# Patient Record
Sex: MALE | Race: BLACK OR AFRICAN AMERICAN | NOT HISPANIC OR LATINO | Employment: FULL TIME | URBAN - METROPOLITAN AREA
[De-identification: names, ages, dates, MRNs, and addresses within clinical notes are randomized per-mention and may not be internally consistent; named-entity substitution may affect disease eponyms.]

---

## 2019-05-07 ENCOUNTER — HOSPITAL ENCOUNTER (EMERGENCY)
Facility: HOSPITAL | Age: 54
Discharge: HOME | End: 2019-05-07
Attending: EMERGENCY MEDICINE | Admitting: EMERGENCY MEDICINE

## 2019-05-07 VITALS
BODY MASS INDEX: 36.68 KG/M2 | WEIGHT: 295 LBS | DIASTOLIC BLOOD PRESSURE: 105 MMHG | TEMPERATURE: 97.9 F | HEIGHT: 75 IN | OXYGEN SATURATION: 100 % | SYSTOLIC BLOOD PRESSURE: 142 MMHG | RESPIRATION RATE: 15 BRPM | HEART RATE: 66 BPM

## 2019-05-07 DIAGNOSIS — S16.1XXA STRAIN OF NECK MUSCLE, INITIAL ENCOUNTER: Primary | ICD-10-CM

## 2019-05-07 LAB
ANION GAP SERPL CALCULATED.3IONS-SCNC: 14 MMOL/L
BASOPHILS # BLD AUTO: 0.02 10*3/MM3 (ref 0–0.2)
BASOPHILS NFR BLD AUTO: 0.3 % (ref 0–1.5)
BUN BLD-MCNC: 18 MG/DL (ref 6–20)
BUN/CREAT SERPL: 13.8 (ref 7–25)
CALCIUM SPEC-SCNC: 9.3 MG/DL (ref 8.6–10.5)
CHLORIDE SERPL-SCNC: 102 MMOL/L (ref 98–107)
CO2 SERPL-SCNC: 25 MMOL/L (ref 22–29)
CREAT BLD-MCNC: 1.3 MG/DL (ref 0.76–1.27)
DEPRECATED RDW RBC AUTO: 42.4 FL (ref 37–54)
EOSINOPHIL # BLD AUTO: 0.23 10*3/MM3 (ref 0–0.4)
EOSINOPHIL NFR BLD AUTO: 3.7 % (ref 0.3–6.2)
ERYTHROCYTE [DISTWIDTH] IN BLOOD BY AUTOMATED COUNT: 14.6 % (ref 12.3–15.4)
GFR SERPL CREATININE-BSD FRML MDRD: 70 ML/MIN/1.73
GLUCOSE BLD-MCNC: 99 MG/DL (ref 65–99)
HCT VFR BLD AUTO: 40.2 % (ref 37.5–51)
HGB BLD-MCNC: 13.1 G/DL (ref 13–17.7)
IMM GRANULOCYTES # BLD AUTO: 0.01 10*3/MM3 (ref 0–0.05)
IMM GRANULOCYTES NFR BLD AUTO: 0.2 % (ref 0–0.5)
LYMPHOCYTES # BLD AUTO: 2.01 10*3/MM3 (ref 0.7–3.1)
LYMPHOCYTES NFR BLD AUTO: 32.4 % (ref 19.6–45.3)
MCH RBC QN AUTO: 26 PG (ref 26.6–33)
MCHC RBC AUTO-ENTMCNC: 32.6 G/DL (ref 31.5–35.7)
MCV RBC AUTO: 79.9 FL (ref 79–97)
MONOCYTES # BLD AUTO: 0.49 10*3/MM3 (ref 0.1–0.9)
MONOCYTES NFR BLD AUTO: 7.9 % (ref 5–12)
NEUTROPHILS # BLD AUTO: 3.45 10*3/MM3 (ref 1.7–7)
NEUTROPHILS NFR BLD AUTO: 55.5 % (ref 42.7–76)
PLATELET # BLD AUTO: 235 10*3/MM3 (ref 140–450)
PMV BLD AUTO: 11.2 FL (ref 6–12)
POTASSIUM BLD-SCNC: 3.9 MMOL/L (ref 3.5–5.2)
RBC # BLD AUTO: 5.03 10*6/MM3 (ref 4.14–5.8)
SODIUM BLD-SCNC: 141 MMOL/L (ref 136–145)
TROPONIN T SERPL-MCNC: <0.01 NG/ML (ref 0–0.03)
WBC NRBC COR # BLD: 6.21 10*3/MM3 (ref 3.4–10.8)

## 2019-05-07 PROCEDURE — 84484 ASSAY OF TROPONIN QUANT: CPT | Performed by: EMERGENCY MEDICINE

## 2019-05-07 PROCEDURE — 85025 COMPLETE CBC W/AUTO DIFF WBC: CPT | Performed by: EMERGENCY MEDICINE

## 2019-05-07 PROCEDURE — 99284 EMERGENCY DEPT VISIT MOD MDM: CPT

## 2019-05-07 PROCEDURE — 93005 ELECTROCARDIOGRAM TRACING: CPT | Performed by: EMERGENCY MEDICINE

## 2019-05-07 PROCEDURE — 80048 BASIC METABOLIC PNL TOTAL CA: CPT | Performed by: EMERGENCY MEDICINE

## 2019-05-07 RX ORDER — ASPIRIN 81 MG/1
81 TABLET ORAL DAILY
COMMUNITY

## 2019-05-07 RX ORDER — NAPROXEN 250 MG/1
500 TABLET ORAL ONCE
Status: DISCONTINUED | OUTPATIENT
Start: 2019-05-07 | End: 2019-05-07

## 2019-05-07 RX ORDER — CYCLOBENZAPRINE HCL 5 MG
5 TABLET ORAL 3 TIMES DAILY PRN
Qty: 20 TABLET | Refills: 0 | Status: SHIPPED | OUTPATIENT
Start: 2019-05-07

## 2019-05-07 RX ORDER — KETOROLAC TROMETHAMINE 15 MG/ML
15 INJECTION, SOLUTION INTRAMUSCULAR; INTRAVENOUS ONCE
Status: DISCONTINUED | OUTPATIENT
Start: 2019-05-07 | End: 2019-05-07 | Stop reason: HOSPADM

## 2019-05-07 RX ORDER — HYDROCHLOROTHIAZIDE 25 MG/1
25 TABLET ORAL DAILY
COMMUNITY

## 2019-05-07 RX ORDER — AMLODIPINE BESYLATE 5 MG/1
5 TABLET ORAL DAILY
COMMUNITY

## 2019-05-07 RX ORDER — CHLORAL HYDRATE 500 MG
2000 CAPSULE ORAL
COMMUNITY

## 2019-05-07 NOTE — ED PROVIDER NOTES
Subjective   54-year-old male presents for evaluation of right-sided neck pain.  The patient works as a .  He denies any injury or trauma.  He is unsure as to what may have triggered his symptoms but states that for the past 4 days or so he has been experiencing pain and tightness to the right side of his neck.  The pain seems to be worse when he turns his head from side to side.  No headache.  No fevers.  No neck stiffness.  No vision changes.  Last night, he states that in addition to the neck pain, he also had pain in his right ear and right jaw, prompting his visit to the ED.  His symptoms have been ongoing since approximately 9 PM.            Review of Systems   Musculoskeletal: Positive for neck pain.   All other systems reviewed and are negative.      Past Medical History:   Diagnosis Date   • Hypertension        No Known Allergies    Past Surgical History:   Procedure Laterality Date   • ACHILLES TENDON SURGERY         History reviewed. No pertinent family history.    Social History     Socioeconomic History   • Marital status: Single     Spouse name: Not on file   • Number of children: Not on file   • Years of education: Not on file   • Highest education level: Not on file   Tobacco Use   • Smoking status: Never Smoker   • Smokeless tobacco: Never Used   Substance and Sexual Activity   • Alcohol use: Yes     Comment: OCCASIONALLY   • Drug use: No   • Sexual activity: Defer           Objective   Physical Exam   Constitutional: He is oriented to person, place, and time. He appears well-developed and well-nourished. No distress.   Well-appearing obese male in no acute distress   HENT:   Head: Normocephalic and atraumatic.   Mouth/Throat: Oropharynx is clear and moist.   Eyes: Pupils are equal, round, and reactive to light.   Neck: Normal range of motion. No JVD present.   Mild right-sided cervical spine tenderness to palpation, no step-offs or deformities noted, no meningeal signs, no nuchal  rigidity   Cardiovascular: Normal rate, regular rhythm, normal heart sounds and intact distal pulses. Exam reveals no gallop and no friction rub.   No murmur heard.  Pulmonary/Chest: Effort normal and breath sounds normal. No respiratory distress. He has no wheezes. He has no rales.   Abdominal: Soft. Bowel sounds are normal. He exhibits no distension and no mass. There is no tenderness. There is no guarding.   Musculoskeletal: Normal range of motion. He exhibits no edema.   Neurological: He is alert and oriented to person, place, and time.   Skin: Skin is warm and dry. No rash noted. He is not diaphoretic. No erythema. No pallor.   Psychiatric: He has a normal mood and affect. Judgment and thought content normal.   Nursing note and vitals reviewed.      Procedures           ED Course  ED Course as of May 07 0728   Tue May 07, 2019   0726 54-year-old male presents for evaluation of right-sided neck pain for the past 3 to 4 days as well as pain in his right ear and jaw since last night.  On arrival to the ED, patient nontoxic-appearing.  [DD]   0727 The patient's only cardiac risk factor is hypertension.  Initial EKG revealed normal sinus rhythm with a heart rate of 66 and no ST segments suggestive of are concerning for ischemia.  Labs unrevealing.  Upon reevaluation following Toradol, patient felt markedly improved.  He is requesting to go home as he needs to get to work this morning.  I feel that his symptoms are most likely musculoskeletal in nature and not secondary to ACS or cardiac etiology.  Prescription for Flexeril as needed.  The patient was encouraged to continue to use NSAIDs as needed.  Agreeable with plan and given appropriate return precautions.  [DD]      ED Course User Index  [DD] Aroldo Roman MD          Recent Results (from the past 24 hour(s))   Basic Metabolic Panel    Collection Time: 05/07/19  4:30 AM   Result Value Ref Range    Glucose 99 65 - 99 mg/dL    BUN 18 6 - 20 mg/dL     "Creatinine 1.30 (H) 0.76 - 1.27 mg/dL    Sodium 141 136 - 145 mmol/L    Potassium 3.9 3.5 - 5.2 mmol/L    Chloride 102 98 - 107 mmol/L    CO2 25.0 22.0 - 29.0 mmol/L    Calcium 9.3 8.6 - 10.5 mg/dL    eGFR  African Amer 70 >60 mL/min/1.73    BUN/Creatinine Ratio 13.8 7.0 - 25.0    Anion Gap 14.0 mmol/L   Troponin    Collection Time: 05/07/19  4:30 AM   Result Value Ref Range    Troponin T <0.010 0.000 - 0.030 ng/mL   CBC Auto Differential    Collection Time: 05/07/19  4:30 AM   Result Value Ref Range    WBC 6.21 3.40 - 10.80 10*3/mm3    RBC 5.03 4.14 - 5.80 10*6/mm3    Hemoglobin 13.1 13.0 - 17.7 g/dL    Hematocrit 40.2 37.5 - 51.0 %    MCV 79.9 79.0 - 97.0 fL    MCH 26.0 (L) 26.6 - 33.0 pg    MCHC 32.6 31.5 - 35.7 g/dL    RDW 14.6 12.3 - 15.4 %    RDW-SD 42.4 37.0 - 54.0 fl    MPV 11.2 6.0 - 12.0 fL    Platelets 235 140 - 450 10*3/mm3    Neutrophil % 55.5 42.7 - 76.0 %    Lymphocyte % 32.4 19.6 - 45.3 %    Monocyte % 7.9 5.0 - 12.0 %    Eosinophil % 3.7 0.3 - 6.2 %    Basophil % 0.3 0.0 - 1.5 %    Immature Grans % 0.2 0.0 - 0.5 %    Neutrophils, Absolute 3.45 1.70 - 7.00 10*3/mm3    Lymphocytes, Absolute 2.01 0.70 - 3.10 10*3/mm3    Monocytes, Absolute 0.49 0.10 - 0.90 10*3/mm3    Eosinophils, Absolute 0.23 0.00 - 0.40 10*3/mm3    Basophils, Absolute 0.02 0.00 - 0.20 10*3/mm3    Immature Grans, Absolute 0.01 0.00 - 0.05 10*3/mm3     Note: In addition to lab results from this visit, the labs listed above may include labs taken at another facility or during a different encounter within the last 24 hours. Please correlate lab times with ED admission and discharge times for further clarification of the services performed during this visit.    No orders to display     Vitals:    05/07/19 0400 05/07/19 0432   BP: 151/95 (!) 142/105   BP Location: Left arm    Patient Position: Sitting    Pulse: 66    Resp: 15    Temp: 97.9 °F (36.6 °C)    TempSrc: Oral    SpO2: 100%    Weight: 134 kg (295 lb)    Height: 190.5 cm (75\")  "     Medications   ketorolac (TORADOL) injection 15 mg (15 mg Intravenous Not Given 5/7/19 2910)     ECG/EMG Results (last 24 hours)     ** No results found for the last 24 hours. **        ECG 12 Lead                     MDM      Final diagnoses:   Strain of neck muscle, initial encounter            Aroldo Roman MD  05/07/19 0301

## 2019-05-07 NOTE — DISCHARGE INSTRUCTIONS
Follow up with one of the [facility] Primary Care Providers below to setup primary care. If you need assistance coordinating a primary care appointment with a [facility] Primary Care Provider, please contact the Primary Care Coordinators at [phone] for appointment scheduling.    [facility], Primary Care   [address] , [address]   [place], [place] [address] [phone]    [facility] Internal Medicine & Endocrinology  [address], [address]  [place] [address] [phone]    [facility] Family Medicine  [address], [address]   [place] [address] [phone]    [facility] Primary Care  [address], [address]  [place], [place] [id number] [phone]    [facility], Primary Care,   [address], [address]   [place], [place] [address] [phone]    [facility] Primary Care  [address]  [place], [place] [address] [phone]    [facility], Primary Care  [address], [address]  [place], [place] [address] [phone]    [facility] Internal Medicine & Pediatrics  [address], [address]   [place] [address] [phone]    [facility], Primary Care  [address] [place] [address] [phone]      [facility] Primary Care  [address], [address]   [place] [address] [phone]    [facility] Family Medicine  [address] [place] [address] [phone]

## 2021-01-20 ENCOUNTER — IMMUNIZATION (OUTPATIENT)
Dept: VACCINE CLINIC | Facility: HOSPITAL | Age: 56
End: 2021-01-20

## 2021-01-20 PROCEDURE — 0001A: CPT | Performed by: INTERNAL MEDICINE

## 2021-01-20 PROCEDURE — 91300 HC SARSCOV02 VAC 30MCG/0.3ML IM: CPT | Performed by: INTERNAL MEDICINE

## 2021-02-10 ENCOUNTER — IMMUNIZATION (OUTPATIENT)
Dept: VACCINE CLINIC | Facility: HOSPITAL | Age: 56
End: 2021-02-10

## 2021-02-10 PROCEDURE — 0002A: CPT | Performed by: INTERNAL MEDICINE

## 2021-02-10 PROCEDURE — 91300 HC SARSCOV02 VAC 30MCG/0.3ML IM: CPT | Performed by: INTERNAL MEDICINE

## 2021-06-22 ENCOUNTER — OFFICE VISIT (OUTPATIENT)
Dept: SLEEP MEDICINE | Facility: HOSPITAL | Age: 56
End: 2021-06-22

## 2021-06-22 VITALS
SYSTOLIC BLOOD PRESSURE: 152 MMHG | HEART RATE: 74 BPM | WEIGHT: 315 LBS | OXYGEN SATURATION: 96 % | DIASTOLIC BLOOD PRESSURE: 90 MMHG | HEIGHT: 75 IN | BODY MASS INDEX: 39.17 KG/M2

## 2021-06-22 DIAGNOSIS — G47.33 OSA (OBSTRUCTIVE SLEEP APNEA): ICD-10-CM

## 2021-06-22 DIAGNOSIS — G47.10 HYPERSOMNOLENCE: Primary | ICD-10-CM

## 2021-06-22 DIAGNOSIS — E66.01 CLASS 3 SEVERE OBESITY WITHOUT SERIOUS COMORBIDITY WITH BODY MASS INDEX (BMI) OF 40.0 TO 44.9 IN ADULT, UNSPECIFIED OBESITY TYPE (HCC): ICD-10-CM

## 2021-06-22 PROCEDURE — 99204 OFFICE O/P NEW MOD 45 MIN: CPT | Performed by: INTERNAL MEDICINE

## 2021-06-22 RX ORDER — INDOMETHACIN 50 MG/1
CAPSULE ORAL
COMMUNITY
Start: 2021-06-17

## 2021-06-22 RX ORDER — AMLODIPINE BESYLATE AND BENAZEPRIL HYDROCHLORIDE 10; 40 MG/1; MG/1
CAPSULE ORAL
COMMUNITY
Start: 2021-05-21

## 2021-06-22 RX ORDER — HYDROCHLOROTHIAZIDE 12.5 MG/1
CAPSULE, GELATIN COATED ORAL
COMMUNITY
Start: 2021-05-21

## 2021-06-22 NOTE — PROGRESS NOTES
New Sleep Patient Office Visit      Patient Name: William Kilpatrick    Referring Physician: Massiel Fuentes APRN    Chief Complaint:    Chief Complaint   Patient presents with   • Sleeping Problem       History of Present Illness: William Kilpatrick is a 56 y.o. male who is here today to establish care with Sleep Medicine.     56-year-old male with history of hypertension presenting with complains of snoring, nonrestorative sleep and frequent awakenings at night going on for many years.  Apparently patient had a sleep study done about 10 years ago and was found to have sleep apnea but he never got on any therapy for that.  Now his girlfriend complains about his snoring and witnessed apneas and when he goes out on vacation with friends they also complained about it and he wants to get it further checked out again and is more amenable to treatment at this time.  Patient states that he goes to sleep easily within 15 minutes but keeps on waking up 2-3 times to use the restroom for unclear reasons.  Sometimes he has to wake up for dry mouth.  Denies any headaches.  Does not feel rested when he wakes up.  He does feel tired during the daytime but able to get through the workday but when he comes home he can take 1 hour nap.  Sometimes it affects his nighttime sleep but most nights not.  He denies any driving issues.  Denies any significant caffeine intake.  Denies any driving problems or sleep-related accidents.  Patient has gained about 50 pounds of weight in last 2 to 3 years.    Patient denies any smoking.  Occasional alcohol use.  No illicit drug use.  He works as a  at [employer].    Patient denies any leg edema.  Denies any upper airway surgery.  Denies any trauma.  Does have allergies and sinus problems.    Further sleep history is as below.    Filer Scale: 15/24    Estimated average amount of sleep per night: 6  Number of times  he wakes up at night: 2-3  Difficulty falling back asleep: no  It  usually takes 15 minutes to go to sleep.  He feels sleepy upon waking up: yes  Rotating or night shift work: yes    Drowsiness/Sleepiness:  He exhibits the following:  excessive daytime sleepiness  excessive daytime fatigue  takes scheduled naps during the day  sleepy even when sleep time is increased    Snoring/Breathing:  He exhibits the following:  loud snoring  snores in all sleep positions  awoken with dry mouth    Reflux:  He describes the following:  occasional    Narcolepsy:  He exhibits the following:  none    RLS/PLMs:  He describes the following:  none    Insomnia:  He describes the following:  frequent awakenings  restless sleep    Parasomnia:  He exhibits the following:  none    Weight:  Weight change in the last year:  gain: 20 lbs    Subjective      Review of Systems:   Review of Systems   Constitutional: Negative.    HENT: Positive for sinus pressure and sneezing.    Respiratory: Negative.    Cardiovascular: Negative.    Gastrointestinal: Negative.    Endocrine: Negative.    Musculoskeletal: Negative.    Skin: Negative.    Neurological: Negative.    Hematological: Negative.    Psychiatric/Behavioral: Positive for sleep disturbance.   All other systems reviewed and are negative.      Past Medical History:   Past Medical History:   Diagnosis Date   • Hypertension        Past Surgical History:   Past Surgical History:   Procedure Laterality Date   • ACHILLES TENDON SURGERY         Family History:   Family History   Problem Relation Age of Onset   • Diabetes Mother    • Hypertension Mother    • Hypertension Sister        Social History:   Social History     Socioeconomic History   • Marital status: Single     Spouse name: Not on file   • Number of children: Not on file   • Years of education: Not on file   • Highest education level: Not on file   Tobacco Use   • Smoking status: Never Smoker   • Smokeless tobacco: Never Used   Substance and Sexual Activity   • Alcohol use: Yes     Comment: OCCASIONALLY  "  • Drug use: No   • Sexual activity: Defer       Medications:     Current Outpatient Medications:   •  amLODIPine-benazepril (LOTREL) 10-40 MG per capsule, , Disp: , Rfl:   •  aspirin 81 MG EC tablet, Take 81 mg by mouth Daily., Disp: , Rfl:   •  hydroCHLOROthiazide (MICROZIDE) 12.5 MG capsule, , Disp: , Rfl:   •  indomethacin (INDOCIN) 50 MG capsule, , Disp: , Rfl:   •  Omega-3 Fatty Acids (FISH OIL) 1000 MG capsule capsule, Take 2,000 mg by mouth Daily With Breakfast., Disp: , Rfl:   •  amLODIPine (NORVASC) 5 MG tablet, Take 5 mg by mouth Daily., Disp: , Rfl:   •  cyclobenzaprine (FLEXERIL) 5 MG tablet, Take 1 tablet by mouth 3 (Three) Times a Day As Needed for Muscle Spasms., Disp: 20 tablet, Rfl: 0  •  hydrochlorothiazide (HYDRODIURIL) 25 MG tablet, Take 25 mg by mouth Daily., Disp: , Rfl:     Allergies:   No Known Allergies    Objective     Physical Exam:  Vital Signs:   Vitals:    06/22/21 1122   BP: 152/90   Pulse: 74   SpO2: 96%   Weight: (!) 147 kg (325 lb)   Height: 190.5 cm (75\")       Physical Exam  Vitals and nursing note reviewed.   Constitutional:       General: He is not in acute distress.     Appearance: He is well-developed. He is not diaphoretic.   HENT:      Head: Normocephalic and atraumatic.      Comments: Mallampati 2 airway, redundant soft palate, 1+ tonsils bilaterally       Nose: Nose normal.      Mouth/Throat:      Pharynx: No oropharyngeal exudate.   Eyes:      General: No scleral icterus.        Right eye: No discharge.         Left eye: No discharge.      Pupils: Pupils are equal, round, and reactive to light.   Neck:      Thyroid: No thyromegaly.      Trachea: No tracheal deviation.      Comments: Neck size 20\"  Cardiovascular:      Rate and Rhythm: Normal rate and regular rhythm.      Heart sounds: Normal heart sounds. No murmur heard.   No friction rub. No gallop.    Pulmonary:      Effort: Pulmonary effort is normal. No respiratory distress.      Breath sounds: Normal breath " sounds. No stridor. No wheezing or rales.   Abdominal:      General: There is no distension.      Palpations: Abdomen is soft.      Tenderness: There is no abdominal tenderness. There is no guarding.   Musculoskeletal:         General: No tenderness.      Cervical back: Neck supple.      Right lower leg: No edema.      Left lower leg: No edema.      Comments: Clubbing: none   Lymphadenopathy:      Cervical: No cervical adenopathy.   Neurological:      Mental Status: He is alert and oriented to person, place, and time.      Cranial Nerves: No cranial nerve deficit.      Coordination: Coordination normal.   Psychiatric:         Behavior: Behavior normal.         Thought Content: Thought content normal.         Judgment: Judgment normal.         Results Review:   I reviewed the patient's new clinical results.  No results found for: TSH    Assessment / Plan      Assessment:   Problem List Items Addressed This Visit     None      Visit Diagnoses     Hypersomnolence    -  Primary    Relevant Orders    Home Sleep Study    Class 3 severe obesity without serious comorbidity with body mass index (BMI) of 40.0 to 44.9 in adult, unspecified obesity type (CMS/HCC)        JULIA (obstructive sleep apnea)        Relevant Orders    Home Sleep Study            Plan:   1.  Patient with obesity, upper airway abnormalities, snoring, witnessed apneas and excessive daytime sleepiness.  Concern for underlying sleep disordered breathing is very high.  Discussed pathophysiology of sleep apnea.  Patient has previous diagnosis of sleep apnea but not on any treatment for unclear reasons.  Now he is more amenable to treatment.  Discussed that we will need to repeat sleep study since we do not have his diagnostic study and he is not sure how long it has been may be more than 10 years.  He is amenable to proceeding with diagnostic study.  We will could not proceed with home-based testing and follow-up after.    2.  Available treatment options  reviewed with him including CPAP therapy, oral appliance therapy, surgical options.  Weight loss as a treatment option and counseled and recommended.  Patient states that he wants to do workout but he feels tired a lot and does not have motivation or energy to do the workout.  Advised to start with a walking program and portion control for weight loss efforts for now.    3.  The side effects of untreated sleep apnea including cardiovascular, neurologic, metabolic and excessive daytime sleepiness discussed and reviewed.  Patient is amenable to proceeding with further testing and treatment as needed.    We will follow him after sleep study to go over the results and discuss further management options.    Follow Up:   After HST    Discussed plan of care in detail with patient today. Patient verbally understands and agrees. I spent 45 minutes on this date of service. This time includes time spent by me in the following activities:preparing for the visit, reviewing tests, obtaining and/or reviewing a separately obtained history, performing a medically appropriate examination, counseling the patient, ordering tests, or procedures, and/or documenting information in the medical record.       Bandar Evangelista MD  Pulmonary Critical Care and Sleep Medicine      Please note that portions of this note may have been completed with a voice recognition program. Efforts were made to edit the dictations, but occasionally words are mistranscribed.

## 2021-07-12 ENCOUNTER — HOSPITAL ENCOUNTER (OUTPATIENT)
Dept: SLEEP MEDICINE | Facility: HOSPITAL | Age: 56
Discharge: HOME | End: 2021-07-12
Admitting: INTERNAL MEDICINE

## 2021-07-12 VITALS — HEIGHT: 75 IN | BODY MASS INDEX: 39.17 KG/M2 | WEIGHT: 315 LBS

## 2021-07-12 DIAGNOSIS — G47.33 OSA (OBSTRUCTIVE SLEEP APNEA): ICD-10-CM

## 2021-07-12 DIAGNOSIS — G47.10 HYPERSOMNOLENCE: ICD-10-CM

## 2021-07-12 PROCEDURE — 95800 SLP STDY UNATTENDED: CPT | Performed by: INTERNAL MEDICINE

## 2021-07-12 PROCEDURE — 95800 SLP STDY UNATTENDED: CPT

## 2021-07-14 DIAGNOSIS — G47.33 SEVERE OBSTRUCTIVE SLEEP APNEA: Primary | ICD-10-CM

## 2021-07-16 NOTE — PROGRESS NOTES
CALLED PATIENT AND ADVISED OF STUDY RESULTS. PATIENT VERBALIZED UNDERSTANDING AND WAS AGREEABLE TO PAP THERAPY. FAXED ORDER TO [facility] 07/16/21 [facility]

## 2021-10-05 ENCOUNTER — OFFICE VISIT (OUTPATIENT)
Dept: SLEEP MEDICINE | Facility: HOSPITAL | Age: 56
End: 2021-10-05

## 2021-10-05 VITALS
DIASTOLIC BLOOD PRESSURE: 111 MMHG | WEIGHT: 314.8 LBS | HEIGHT: 75 IN | BODY MASS INDEX: 39.14 KG/M2 | SYSTOLIC BLOOD PRESSURE: 200 MMHG | OXYGEN SATURATION: 97 % | HEART RATE: 63 BPM

## 2021-10-05 DIAGNOSIS — G47.33 SEVERE OBSTRUCTIVE SLEEP APNEA: Primary | ICD-10-CM

## 2021-10-05 DIAGNOSIS — G47.34 NOCTURNAL HYPOXEMIA: ICD-10-CM

## 2021-10-05 PROCEDURE — 99213 OFFICE O/P EST LOW 20 MIN: CPT | Performed by: NURSE PRACTITIONER

## 2021-10-05 NOTE — PROGRESS NOTES
"    Chief Complaint:   Chief Complaint   Patient presents with   • Follow-up       HPI:    William Kilpatrick is a 56 y.o. male here for follow-up of sleep apnea.  Patient does have a history of hypertension.  Patient was seen in consult 6/22/2021 for snoring, nonrestorative sleep, frequent awakenings, and witnessed apneas.  He had a sleep study 7/12/2021 that did show severe obstructive sleep apnea as well as nocturnal hypoxemia.  Patient states he is leaving 6 to 7 hours nightly and does feel refreshed upon awakening.  Patient will go to sleep within 5 to 10 minutes.  Patient does get up 1-2 times to use the restroom but states this is \"much better than before.\"  Patient has an [place] score of 7/24.  Patient is having no concerns or complaints today and wishes to continue CPAP therapy.        Current medications are:   Current Outpatient Medications:   •  amLODIPine (NORVASC) 5 MG tablet, Take 5 mg by mouth Daily., Disp: , Rfl:   •  amLODIPine-benazepril (LOTREL) 10-40 MG per capsule, , Disp: , Rfl:   •  aspirin 81 MG EC tablet, Take 81 mg by mouth Daily., Disp: , Rfl:   •  cyclobenzaprine (FLEXERIL) 5 MG tablet, Take 1 tablet by mouth 3 (Three) Times a Day As Needed for Muscle Spasms., Disp: 20 tablet, Rfl: 0  •  hydrochlorothiazide (HYDRODIURIL) 25 MG tablet, Take 25 mg by mouth Daily., Disp: , Rfl:   •  hydroCHLOROthiazide (MICROZIDE) 12.5 MG capsule, , Disp: , Rfl:   •  indomethacin (INDOCIN) 50 MG capsule, , Disp: , Rfl:   •  Omega-3 Fatty Acids (FISH OIL) 1000 MG capsule capsule, Take 2,000 mg by mouth Daily With Breakfast., Disp: , Rfl: .      The patient's relevant past medical, surgical, family and social history were reviewed and updated in Epic as appropriate.       Review of Systems   Respiratory: Positive for apnea.    Allergic/Immunologic: Positive for environmental allergies.   Psychiatric/Behavioral: Positive for sleep disturbance.   All other systems reviewed and are " negative.        Objective:    Physical Exam  Constitutional:       Appearance: Normal appearance.   HENT:      Head: Normocephalic and atraumatic.      Mouth/Throat:      Mouth: Mucous membranes are moist.      Pharynx: Oropharynx is clear.      Comments: Mallampati 2 anatomy  Eyes:      Conjunctiva/sclera: Conjunctivae normal.      Pupils: Pupils are equal, round, and reactive to light.   Cardiovascular:      Rate and Rhythm: Normal rate and regular rhythm.   Pulmonary:      Effort: Pulmonary effort is normal.      Breath sounds: Normal breath sounds.   Skin:     General: Skin is warm and dry.   Neurological:      Mental Status: He is alert and oriented to person, place, and time.   Psychiatric:         Mood and Affect: Mood normal.         Behavior: Behavior normal.         Thought Content: Thought content normal.         Judgment: Judgment normal.       55/58 days of use  Greater than 4-hour use 76%  95th percentile pressure 10.1  AHI of 3.6  Settings 8-16  ASSESSMENT/PLAN    Diagnoses and all orders for this visit:    1. Severe obstructive sleep apnea (Primary)  -     CPAP Therapy            1. Counseled patient regarding multimodal approach with healthy nutrition, healthy sleep, regular physical activity, social activities, counseling, and medications. Encouraged to practice lateral  sleep position. Avoid alcohol and sedatives close to bedtime.  2. Refill supplies x1 year.  Return to clinic 1 year or sooner symptoms warrant.  Overnight oximetry to be done while wearing CPAP to reassess nocturnal hypoxemia and patient will be called with these results.    I have reviewed the results of my evaluation and impression and discussed my recommendations in detail with the patient.      Signed by  XOHCILT Lugo    October 5, 2021      CC: Provider, No Known          No ref. provider found

## 2021-12-09 DIAGNOSIS — G47.33 SEVERE OBSTRUCTIVE SLEEP APNEA: ICD-10-CM

## 2021-12-09 DIAGNOSIS — G47.34 NOCTURNAL HYPOXEMIA: ICD-10-CM

## 2021-12-13 ENCOUNTER — TELEPHONE (OUTPATIENT)
Dept: SLEEP MEDICINE | Facility: HOSPITAL | Age: 56
End: 2021-12-13

## 2023-02-01 ENCOUNTER — ANESTHESIA EVENT (OUTPATIENT)
Dept: ENDOSCOPY | Age: 58
End: 2023-02-01
Payer: OTHER GOVERNMENT

## 2023-02-02 ENCOUNTER — HOSPITAL ENCOUNTER (OUTPATIENT)
Age: 58
Setting detail: OUTPATIENT SURGERY
Discharge: HOME | End: 2023-02-02
Attending: INTERNAL MEDICINE | Admitting: INTERNAL MEDICINE
Payer: OTHER GOVERNMENT

## 2023-02-02 ENCOUNTER — APPOINTMENT (OUTPATIENT)
Dept: ENDOSCOPY | Age: 58
End: 2023-02-02
Attending: INTERNAL MEDICINE
Payer: OTHER GOVERNMENT

## 2023-02-02 ENCOUNTER — ANESTHESIA (OUTPATIENT)
Dept: ENDOSCOPY | Age: 58
End: 2023-02-02
Payer: OTHER GOVERNMENT

## 2023-02-02 VITALS
TEMPERATURE: 97.4 F | HEART RATE: 89 BPM | SYSTOLIC BLOOD PRESSURE: 138 MMHG | RESPIRATION RATE: 16 BRPM | DIASTOLIC BLOOD PRESSURE: 78 MMHG | WEIGHT: 197 LBS | HEIGHT: 66 IN | BODY MASS INDEX: 31.66 KG/M2 | OXYGEN SATURATION: 98 %

## 2023-02-02 PROCEDURE — 77030019988 HC FCPS ENDOSC DISP BSC -B: Performed by: INTERNAL MEDICINE

## 2023-02-02 PROCEDURE — 76060000032 HC ANESTHESIA 0.5 TO 1 HR: Performed by: INTERNAL MEDICINE

## 2023-02-02 PROCEDURE — 2709999900 HC NON-CHARGEABLE SUPPLY: Performed by: INTERNAL MEDICINE

## 2023-02-02 PROCEDURE — 88305 TISSUE EXAM BY PATHOLOGIST: CPT

## 2023-02-02 PROCEDURE — 76040000007: Performed by: INTERNAL MEDICINE

## 2023-02-02 PROCEDURE — 74011000250 HC RX REV CODE- 250: Performed by: ANESTHESIOLOGY

## 2023-02-02 PROCEDURE — 74011250636 HC RX REV CODE- 250/636: Performed by: ANESTHESIOLOGY

## 2023-02-02 RX ORDER — LIDOCAINE HYDROCHLORIDE 20 MG/ML
INJECTION, SOLUTION EPIDURAL; INFILTRATION; INTRACAUDAL; PERINEURAL AS NEEDED
Status: DISCONTINUED | OUTPATIENT
Start: 2023-02-02 | End: 2023-02-02 | Stop reason: HOSPADM

## 2023-02-02 RX ORDER — AZATHIOPRINE 50 MG/1
50 TABLET ORAL DAILY
COMMUNITY

## 2023-02-02 RX ORDER — SODIUM CHLORIDE, SODIUM LACTATE, POTASSIUM CHLORIDE, CALCIUM CHLORIDE 600; 310; 30; 20 MG/100ML; MG/100ML; MG/100ML; MG/100ML
50 INJECTION, SOLUTION INTRAVENOUS CONTINUOUS
Status: DISCONTINUED | OUTPATIENT
Start: 2023-02-02 | End: 2023-02-02 | Stop reason: HOSPADM

## 2023-02-02 RX ORDER — PROPOFOL 10 MG/ML
INJECTION, EMULSION INTRAVENOUS AS NEEDED
Status: DISCONTINUED | OUTPATIENT
Start: 2023-02-02 | End: 2023-02-02 | Stop reason: HOSPADM

## 2023-02-02 RX ORDER — ADALIMUMAB 40MG/0.8ML
40 KIT SUBCUTANEOUS
COMMUNITY

## 2023-02-02 RX ORDER — SODIUM CHLORIDE, SODIUM LACTATE, POTASSIUM CHLORIDE, CALCIUM CHLORIDE 600; 310; 30; 20 MG/100ML; MG/100ML; MG/100ML; MG/100ML
INJECTION, SOLUTION INTRAVENOUS
Status: DISCONTINUED | OUTPATIENT
Start: 2023-02-02 | End: 2023-02-02 | Stop reason: HOSPADM

## 2023-02-02 RX ADMIN — PROPOFOL 100 MG: 10 INJECTION, EMULSION INTRAVENOUS at 11:02

## 2023-02-02 RX ADMIN — PROPOFOL 30 MG: 10 INJECTION, EMULSION INTRAVENOUS at 11:10

## 2023-02-02 RX ADMIN — SODIUM CHLORIDE, POTASSIUM CHLORIDE, SODIUM LACTATE AND CALCIUM CHLORIDE: 600; 310; 30; 20 INJECTION, SOLUTION INTRAVENOUS at 10:59

## 2023-02-02 RX ADMIN — PROPOFOL 30 MG: 10 INJECTION, EMULSION INTRAVENOUS at 11:06

## 2023-02-02 RX ADMIN — LIDOCAINE HYDROCHLORIDE 80 MG: 20 INJECTION, SOLUTION EPIDURAL; INFILTRATION; INTRACAUDAL; PERINEURAL at 11:02

## 2023-02-02 NOTE — ANESTHESIA POSTPROCEDURE EVALUATION
Procedure(s):  COLONOSCOPY.    total IV anesthesia, MAC    Anesthesia Post Evaluation      Multimodal analgesia: multimodal analgesia not used between 6 hours prior to anesthesia start to PACU discharge  Patient location during evaluation: bedside (Endoscopy suite)  Patient participation: complete - patient cannot participate  Level of consciousness: sleepy but conscious  Pain score: 0  Pain management: adequate  Airway patency: patent  Anesthetic complications: no  Cardiovascular status: acceptable  Respiratory status: acceptable and nasal cannula  Hydration status: acceptable  Comments: This patient remained on the stretcher. The patient was handed off to the endoscopy nursing team.  All questions regarding pre-, intra-, and postoperative care were answered.   Post anesthesia nausea and vomiting:  none      INITIAL Post-op Vital signs:   Vitals Value Taken Time   BP 97/75 02/02/23 1113   Temp     Pulse 76 02/02/23 1113   Resp 13 02/02/23 1113   SpO2 95 % 02/02/23 1113

## 2023-02-02 NOTE — ANESTHESIA PREPROCEDURE EVALUATION
Relevant Problems   No relevant active problems       Anesthetic History   No history of anesthetic complications            Review of Systems / Medical History  Patient summary reviewed and pertinent labs reviewed    Pulmonary  Within defined limits                 Neuro/Psych   Within defined limits           Cardiovascular  Within defined limits                     GI/Hepatic/Renal               Comments: +Crohn's Endo/Other  Within defined limits           Other Findings            Past Medical History:   Diagnosis Date    Crohn's colitis, with rectal bleeding ([facility]     dx 2020    Crohn's disease ([facility]     Diarrhea     HLD (hyperlipidemia)     Rectal bleeding        Past Surgical History:   Procedure Laterality Date    HX COLONOSCOPY  2021    crohn's diagnosis; polyps removed (no dysplasia)       No current outpatient medications    No current outpatient medications on file. No current facility-administered medications for this visit. No data found.     No results found for: WBC, WBCLT, HGBPOC, HGB, HGBP, HCTPOC, HCT, PHCT, RBCH, PLT, MCV, HGBEXT, HCTEXT, PLTEXT, HGBEXT, HCTEXT, PLTEXT  No results found for: NA, K, CL, CO2, AGAP, GLU, BUN, CREA, BUCR, GFRAA, GFRNA, CA, GFRAA  No results found for: APTT, PTP, INR, INREXT, INREXT  No results found for: GLU, GLUCPOC      Physical Exam    Airway  Mallampati: II  TM Distance: 4 - 6 cm  Neck ROM: normal range of motion   Mouth opening: Normal     Cardiovascular    Rhythm: regular  Rate: normal         Dental  No notable dental hx       Pulmonary  Breath sounds clear to auscultation               Abdominal  GI exam deferred       Other Findings            Anesthetic Plan    ASA: 2  Anesthesia type: total IV anesthesia and MAC    Monitoring Plan: Continuous noninvasive hemodynamic monitoring      Induction: Intravenous  Anesthetic plan and risks discussed with: Patient and Family

## 2023-02-02 NOTE — PROGRESS NOTES
Discharge instructions reivewed with patient and called facility talked to [address],LPN verbalized understanding IV out and wheeled out with security.

## 2023-05-09 ENCOUNTER — LAB (OUTPATIENT)
Dept: LAB | Facility: HOSPITAL | Age: 58
End: 2023-05-09
Payer: COMMERCIAL

## 2023-05-09 ENCOUNTER — OFFICE VISIT (OUTPATIENT)
Dept: INTERNAL MEDICINE | Facility: CLINIC | Age: 58
End: 2023-05-09
Payer: COMMERCIAL

## 2023-05-09 VITALS
HEART RATE: 78 BPM | TEMPERATURE: 97.2 F | WEIGHT: 315 LBS | SYSTOLIC BLOOD PRESSURE: 134 MMHG | BODY MASS INDEX: 39.17 KG/M2 | DIASTOLIC BLOOD PRESSURE: 86 MMHG | OXYGEN SATURATION: 98 % | HEIGHT: 75 IN

## 2023-05-09 DIAGNOSIS — I10 ESSENTIAL HYPERTENSION: ICD-10-CM

## 2023-05-09 DIAGNOSIS — Z12.5 SCREENING PSA (PROSTATE SPECIFIC ANTIGEN): ICD-10-CM

## 2023-05-09 DIAGNOSIS — E66.01 CLASS 3 SEVERE OBESITY DUE TO EXCESS CALORIES WITH SERIOUS COMORBIDITY AND BODY MASS INDEX (BMI) OF 40.0 TO 44.9 IN ADULT: ICD-10-CM

## 2023-05-09 DIAGNOSIS — R10.9 FLANK PAIN: ICD-10-CM

## 2023-05-09 DIAGNOSIS — M54.50 CHRONIC BILATERAL LOW BACK PAIN WITHOUT SCIATICA: ICD-10-CM

## 2023-05-09 DIAGNOSIS — Z00.00 ANNUAL PHYSICAL EXAM: Primary | ICD-10-CM

## 2023-05-09 DIAGNOSIS — G89.29 CHRONIC BILATERAL LOW BACK PAIN WITHOUT SCIATICA: ICD-10-CM

## 2023-05-09 LAB
BILIRUB BLD-MCNC: NEGATIVE MG/DL
CLARITY, POC: ABNORMAL
COLOR UR: YELLOW
EXPIRATION DATE: ABNORMAL
GLUCOSE UR STRIP-MCNC: NEGATIVE MG/DL
HBA1C MFR BLD: 6.3 % (ref 4.8–5.6)
KETONES UR QL: NEGATIVE
LEUKOCYTE EST, POC: NEGATIVE
Lab: ABNORMAL
NITRITE UR-MCNC: NEGATIVE MG/ML
PH UR: 5.5 [PH] (ref 5–8)
PROT UR STRIP-MCNC: NEGATIVE MG/DL
RBC # UR STRIP: NEGATIVE /UL
SP GR UR: 1.02 (ref 1–1.03)
UROBILINOGEN UR QL: NORMAL

## 2023-05-09 PROCEDURE — 83036 HEMOGLOBIN GLYCOSYLATED A1C: CPT | Performed by: INTERNAL MEDICINE

## 2023-05-09 PROCEDURE — 80050 GENERAL HEALTH PANEL: CPT | Performed by: INTERNAL MEDICINE

## 2023-05-09 PROCEDURE — G0103 PSA SCREENING: HCPCS | Performed by: INTERNAL MEDICINE

## 2023-05-09 PROCEDURE — 80061 LIPID PANEL: CPT | Performed by: INTERNAL MEDICINE

## 2023-05-09 PROCEDURE — 82607 VITAMIN B-12: CPT | Performed by: INTERNAL MEDICINE

## 2023-05-09 RX ORDER — CYCLOBENZAPRINE HCL 5 MG
5 TABLET ORAL 3 TIMES DAILY PRN
Qty: 30 TABLET | Refills: 0 | Status: SHIPPED | OUTPATIENT
Start: 2023-05-09

## 2023-05-09 NOTE — PROGRESS NOTES
"     Office Note      Date: 2023  Patient Name: William Kilpatrick  MRN: [id number]  : 1965    Chief Complaint   Patient presents with   • Annual Exam   • Kidney issues       History of Present Illness: William Kilpatrick is a 58 y.o. male who presents for Annual Exam and Kidney issues. Has been told renal function elevated in the past. Drinks about 4 bottles of water per day. Does not wear cpap every night. Trying to lose weight via diet.   Colonoscopy scheduled for   Has b/l low back pain that is non radiating. Concerned about his kidneys.  Subjective      Review of Systems:   Pertinent review of systems per HPI.    Review of Systems   Constitutional: Negative for activity change, appetite change, chills, diaphoresis and fatigue.   HENT: Negative for congestion, dental problem, drooling, ear discharge, ear pain, facial swelling and hearing loss.    Eyes: Negative for photophobia, pain, discharge, redness, itching and visual disturbance.   Respiratory: Negative for cough, choking, chest tightness and shortness of breath.    Cardiovascular: Negative for chest pain, palpitations and leg swelling.   Endocrine: Negative for cold intolerance, heat intolerance, polydipsia and polyuria.   Genitourinary: Negative for difficulty urinating, dysuria, flank pain, frequency and hematuria.   Musculoskeletal: Positive for back pain. Negative for arthralgias.   Skin: Negative for color change, pallor, rash and wound.   Allergic/Immunologic: Negative for environmental allergies.   Neurological: Negative for dizziness, facial asymmetry, light-headedness and headaches.   Psychiatric/Behavioral: Negative.    All other systems reviewed and are negative.    No Known Allergies    Objective     Physical Exam:  Vital Signs:   Vitals:    23 1452   BP: 134/86   Pulse: 78   Temp: 97.2 °F (36.2 °C)   SpO2: 98%   Weight: (!) 146 kg (322 lb)   Height: 190.5 cm (75\")   PainSc: 0-No pain      Body mass index is 40.25 " kg/m².    Physical Exam  Vitals and nursing note reviewed.   Constitutional:       General: He is not in acute distress.     Appearance: He is well-developed.   HENT:      Head: Normocephalic and atraumatic.      Right Ear: Tympanic membrane and external ear normal.      Left Ear: Tympanic membrane and external ear normal.   Eyes:      General: No scleral icterus.        Right eye: No discharge.         Left eye: No discharge.      Conjunctiva/sclera: Conjunctivae normal.   Cardiovascular:      Rate and Rhythm: Normal rate and regular rhythm.      Heart sounds: Normal heart sounds. No murmur heard.    No friction rub. No gallop.   Pulmonary:      Effort: Pulmonary effort is normal. No respiratory distress.      Breath sounds: Normal breath sounds. No wheezing or rales.   Abdominal:      Tenderness: There is no right CVA tenderness or left CVA tenderness.   Skin:     General: Skin is warm and dry.      Coloration: Skin is not pale.         Assessment / Plan      Assessment & Plan:    1. Annual physical exam  Counseled on:  Mammo starting at age 40  Pap smear q5 years if normal pap cytology with neg HPV, otherwise q3 years  Colonoscopy at 46 y/o  PNA vaccinations starting at age 65  shingrix at age 50  Td/Tdap q 10 years  Wear seatbelt when driving  Flu shot annually    - CBC (No Diff)  - Comprehensive Metabolic Panel  - Lipid Panel  - TSH Rfx On Abnormal To Free T4  - Vitamin B12  - Hemoglobin A1c    2. Class 3 severe obesity due to excess calories with serious comorbidity and body mass index (BMI) of 40.0 to 44.9 in adult  Counseled on weight loss and diet  - TSH Rfx On Abnormal To Free T4  - Hemoglobin A1c    3. Essential hypertension  Chronic med issue and stable.   - Comprehensive Metabolic Panel    4. Flank pain  UA neg, tx for muscle strain  - POCT urinalysis dipstick, automated    5. Screening PSA (prostate specific antigen)    - PSA Screen    6. Chronic bilateral low back pain without sciatica  Advised  tylenol   - cyclobenzaprine (FLEXERIL) 5 MG tablet; Take 1 tablet by mouth 3 (Three) Times a Day As Needed for Muscle Spasms.  Dispense: 30 tablet; Refill: 0      Christi Joya MD  05/09/2023

## 2023-05-10 ENCOUNTER — TELEPHONE (OUTPATIENT)
Dept: INTERNAL MEDICINE | Facility: CLINIC | Age: 58
End: 2023-05-10
Payer: COMMERCIAL

## 2023-05-10 LAB
ALBUMIN SERPL-MCNC: 5 G/DL (ref 3.5–5.2)
ALBUMIN/GLOB SERPL: 1.4 G/DL
ALP SERPL-CCNC: 67 U/L (ref 39–117)
ALT SERPL W P-5'-P-CCNC: 22 U/L (ref 1–41)
ANION GAP SERPL CALCULATED.3IONS-SCNC: 12 MMOL/L (ref 5–15)
AST SERPL-CCNC: 24 U/L (ref 1–40)
BILIRUB SERPL-MCNC: 0.5 MG/DL (ref 0–1.2)
BUN SERPL-MCNC: 21 MG/DL (ref 6–20)
BUN/CREAT SERPL: 12.7 (ref 7–25)
CALCIUM SPEC-SCNC: 9.9 MG/DL (ref 8.6–10.5)
CHLORIDE SERPL-SCNC: 101 MMOL/L (ref 98–107)
CHOLEST SERPL-MCNC: 246 MG/DL (ref 0–200)
CO2 SERPL-SCNC: 26 MMOL/L (ref 22–29)
CREAT SERPL-MCNC: 1.66 MG/DL (ref 0.76–1.27)
DEPRECATED RDW RBC AUTO: 42.3 FL (ref 37–54)
EGFRCR SERPLBLD CKD-EPI 2021: 48.1 ML/MIN/1.73
ERYTHROCYTE [DISTWIDTH] IN BLOOD BY AUTOMATED COUNT: 15.2 % (ref 12.3–15.4)
GLOBULIN UR ELPH-MCNC: 3.5 GM/DL
GLUCOSE SERPL-MCNC: 88 MG/DL (ref 65–99)
HCT VFR BLD AUTO: 41.6 % (ref 37.5–51)
HDLC SERPL-MCNC: 35 MG/DL (ref 40–60)
HGB BLD-MCNC: 13.8 G/DL (ref 13–17.7)
LDLC SERPL CALC-MCNC: 173 MG/DL (ref 0–100)
LDLC/HDLC SERPL: 4.87 {RATIO}
MCH RBC QN AUTO: 25.8 PG (ref 26.6–33)
MCHC RBC AUTO-ENTMCNC: 33.2 G/DL (ref 31.5–35.7)
MCV RBC AUTO: 77.8 FL (ref 79–97)
PLATELET # BLD AUTO: 316 10*3/MM3 (ref 140–450)
PMV BLD AUTO: 11.8 FL (ref 6–12)
POTASSIUM SERPL-SCNC: 4.4 MMOL/L (ref 3.5–5.2)
PROT SERPL-MCNC: 8.5 G/DL (ref 6–8.5)
PSA SERPL-MCNC: 0.25 NG/ML (ref 0–4)
RBC # BLD AUTO: 5.35 10*6/MM3 (ref 4.14–5.8)
SODIUM SERPL-SCNC: 139 MMOL/L (ref 136–145)
TRIGL SERPL-MCNC: 203 MG/DL (ref 0–150)
TSH SERPL DL<=0.05 MIU/L-ACNC: 0.91 UIU/ML (ref 0.27–4.2)
VIT B12 BLD-MCNC: 383 PG/ML (ref 211–946)
VLDLC SERPL-MCNC: 38 MG/DL (ref 5–40)
WBC NRBC COR # BLD: 8.35 10*3/MM3 (ref 3.4–10.8)

## 2023-05-10 NOTE — TELEPHONE ENCOUNTER
----- Message from Christi Joya MD sent at 5/10/2023 10:04 AM EDT -----  LDL elevated and A1c shows prediabetes, will start on cholesterol medicine.  Crestor 5 mg sent to pharmacy.  Renal functio elevated likely from dehydration. Avoid nsaids. Repeat in 3 months.

## 2023-05-12 ENCOUNTER — PATIENT ROUNDING (BHMG ONLY) (OUTPATIENT)
Dept: INTERNAL MEDICINE | Facility: CLINIC | Age: 58
End: 2023-05-12
Payer: COMMERCIAL

## 2023-05-12 NOTE — PROGRESS NOTES
A  my chart message has been sent to the patient for patient rounding with [facility].

## 2023-11-04 ENCOUNTER — HOSPITAL ENCOUNTER (EMERGENCY)
Facility: HOSPITAL | Age: 58
Discharge: HOME | End: 2023-11-04
Attending: EMERGENCY MEDICINE
Payer: COMMERCIAL

## 2023-11-04 ENCOUNTER — APPOINTMENT (OUTPATIENT)
Dept: GENERAL RADIOLOGY | Facility: HOSPITAL | Age: 58
End: 2023-11-04
Payer: COMMERCIAL

## 2023-11-04 ENCOUNTER — APPOINTMENT (OUTPATIENT)
Dept: CT IMAGING | Facility: HOSPITAL | Age: 58
End: 2023-11-04
Payer: COMMERCIAL

## 2023-11-04 VITALS
SYSTOLIC BLOOD PRESSURE: 167 MMHG | OXYGEN SATURATION: 97 % | RESPIRATION RATE: 20 BRPM | HEIGHT: 75 IN | HEART RATE: 61 BPM | WEIGHT: 290 LBS | TEMPERATURE: 97.8 F | DIASTOLIC BLOOD PRESSURE: 99 MMHG | BODY MASS INDEX: 36.06 KG/M2

## 2023-11-04 DIAGNOSIS — N50.89 GENITAL LESION, MALE: ICD-10-CM

## 2023-11-04 DIAGNOSIS — Z86.39 HISTORY OF OBESITY: ICD-10-CM

## 2023-11-04 DIAGNOSIS — R20.2 NUMBNESS AND TINGLING: ICD-10-CM

## 2023-11-04 DIAGNOSIS — N18.9 CHRONIC RENAL IMPAIRMENT, UNSPECIFIED CKD STAGE: ICD-10-CM

## 2023-11-04 DIAGNOSIS — R20.0 NUMBNESS AND TINGLING: ICD-10-CM

## 2023-11-04 DIAGNOSIS — I10 ELEVATED BLOOD PRESSURE READING WITH DIAGNOSIS OF HYPERTENSION: Primary | ICD-10-CM

## 2023-11-04 DIAGNOSIS — G47.33 OSA ON CPAP: ICD-10-CM

## 2023-11-04 DIAGNOSIS — Z86.79 HISTORY OF HYPERTENSION: ICD-10-CM

## 2023-11-04 DIAGNOSIS — R60.0 PEDAL EDEMA: ICD-10-CM

## 2023-11-04 LAB
ALBUMIN SERPL-MCNC: 4.7 G/DL (ref 3.5–5.2)
ALBUMIN/GLOB SERPL: 1.6 G/DL
ALP SERPL-CCNC: 65 U/L (ref 39–117)
ALT SERPL W P-5'-P-CCNC: 24 U/L (ref 1–41)
ANION GAP SERPL CALCULATED.3IONS-SCNC: 10 MMOL/L (ref 5–15)
AST SERPL-CCNC: 26 U/L (ref 1–40)
BASOPHILS # BLD AUTO: 0.05 10*3/MM3 (ref 0–0.2)
BASOPHILS NFR BLD AUTO: 0.6 % (ref 0–1.5)
BILIRUB SERPL-MCNC: 0.6 MG/DL (ref 0–1.2)
BILIRUB UR QL STRIP: NEGATIVE
BUN SERPL-MCNC: 14 MG/DL (ref 6–20)
BUN/CREAT SERPL: 10.1 (ref 7–25)
CALCIUM SPEC-SCNC: 9.7 MG/DL (ref 8.6–10.5)
CHLORIDE SERPL-SCNC: 100 MMOL/L (ref 98–107)
CLARITY UR: CLEAR
CO2 SERPL-SCNC: 30 MMOL/L (ref 22–29)
COLOR UR: YELLOW
CREAT SERPL-MCNC: 1.39 MG/DL (ref 0.76–1.27)
D-LACTATE SERPL-SCNC: 1.3 MMOL/L (ref 0.5–2)
DEPRECATED RDW RBC AUTO: 42.4 FL (ref 37–54)
EGFRCR SERPLBLD CKD-EPI 2021: 59.5 ML/MIN/1.73
EOSINOPHIL # BLD AUTO: 0.28 10*3/MM3 (ref 0–0.4)
EOSINOPHIL NFR BLD AUTO: 3.3 % (ref 0.3–6.2)
ERYTHROCYTE [DISTWIDTH] IN BLOOD BY AUTOMATED COUNT: 14.6 % (ref 12.3–15.4)
GLOBULIN UR ELPH-MCNC: 3 GM/DL
GLUCOSE SERPL-MCNC: 107 MG/DL (ref 65–99)
GLUCOSE UR STRIP-MCNC: NEGATIVE MG/DL
HCT VFR BLD AUTO: 41.7 % (ref 37.5–51)
HGB BLD-MCNC: 13.2 G/DL (ref 13–17.7)
HGB UR QL STRIP.AUTO: NEGATIVE
IMM GRANULOCYTES # BLD AUTO: 0.02 10*3/MM3 (ref 0–0.05)
IMM GRANULOCYTES NFR BLD AUTO: 0.2 % (ref 0–0.5)
KETONES UR QL STRIP: NEGATIVE
LEUKOCYTE ESTERASE UR QL STRIP.AUTO: NEGATIVE
LYMPHOCYTES # BLD AUTO: 1.98 10*3/MM3 (ref 0.7–3.1)
LYMPHOCYTES NFR BLD AUTO: 23.7 % (ref 19.6–45.3)
MCH RBC QN AUTO: 25.3 PG (ref 26.6–33)
MCHC RBC AUTO-ENTMCNC: 31.7 G/DL (ref 31.5–35.7)
MCV RBC AUTO: 80 FL (ref 79–97)
MONOCYTES # BLD AUTO: 0.58 10*3/MM3 (ref 0.1–0.9)
MONOCYTES NFR BLD AUTO: 6.9 % (ref 5–12)
NEUTROPHILS NFR BLD AUTO: 5.45 10*3/MM3 (ref 1.7–7)
NEUTROPHILS NFR BLD AUTO: 65.3 % (ref 42.7–76)
NITRITE UR QL STRIP: NEGATIVE
NRBC BLD AUTO-RTO: 0 /100 WBC (ref 0–0.2)
NT-PROBNP SERPL-MCNC: 50.1 PG/ML (ref 0–900)
PH UR STRIP.AUTO: 6.5 [PH] (ref 5–8)
PLATELET # BLD AUTO: 260 10*3/MM3 (ref 140–450)
PMV BLD AUTO: 10.9 FL (ref 6–12)
POTASSIUM SERPL-SCNC: 4.3 MMOL/L (ref 3.5–5.2)
PROCALCITONIN SERPL-MCNC: 0.06 NG/ML (ref 0–0.25)
PROT SERPL-MCNC: 7.7 G/DL (ref 6–8.5)
PROT UR QL STRIP: NEGATIVE
QT INTERVAL: 406 MS
QTC INTERVAL: 415 MS
RBC # BLD AUTO: 5.21 10*6/MM3 (ref 4.14–5.8)
SODIUM SERPL-SCNC: 140 MMOL/L (ref 136–145)
SP GR UR STRIP: 1.01 (ref 1–1.03)
TROPONIN T SERPL HS-MCNC: 11 NG/L
TSH SERPL DL<=0.05 MIU/L-ACNC: 1.63 UIU/ML (ref 0.27–4.2)
UROBILINOGEN UR QL STRIP: NORMAL
WBC NRBC COR # BLD: 8.36 10*3/MM3 (ref 3.4–10.8)

## 2023-11-04 PROCEDURE — 83880 ASSAY OF NATRIURETIC PEPTIDE: CPT | Performed by: PHYSICIAN ASSISTANT

## 2023-11-04 PROCEDURE — 99284 EMERGENCY DEPT VISIT MOD MDM: CPT

## 2023-11-04 PROCEDURE — 93005 ELECTROCARDIOGRAM TRACING: CPT | Performed by: PHYSICIAN ASSISTANT

## 2023-11-04 PROCEDURE — 87491 CHLMYD TRACH DNA AMP PROBE: CPT | Performed by: PHYSICIAN ASSISTANT

## 2023-11-04 PROCEDURE — 71045 X-RAY EXAM CHEST 1 VIEW: CPT

## 2023-11-04 PROCEDURE — 87591 N.GONORRHOEAE DNA AMP PROB: CPT | Performed by: PHYSICIAN ASSISTANT

## 2023-11-04 PROCEDURE — 70450 CT HEAD/BRAIN W/O DYE: CPT

## 2023-11-04 PROCEDURE — 36415 COLL VENOUS BLD VENIPUNCTURE: CPT

## 2023-11-04 PROCEDURE — 81003 URINALYSIS AUTO W/O SCOPE: CPT | Performed by: PHYSICIAN ASSISTANT

## 2023-11-04 PROCEDURE — 83605 ASSAY OF LACTIC ACID: CPT | Performed by: PHYSICIAN ASSISTANT

## 2023-11-04 PROCEDURE — 80050 GENERAL HEALTH PANEL: CPT | Performed by: PHYSICIAN ASSISTANT

## 2023-11-04 PROCEDURE — 84484 ASSAY OF TROPONIN QUANT: CPT | Performed by: PHYSICIAN ASSISTANT

## 2023-11-04 PROCEDURE — 84145 PROCALCITONIN (PCT): CPT | Performed by: PHYSICIAN ASSISTANT

## 2023-11-04 RX ORDER — SODIUM CHLORIDE 0.9 % (FLUSH) 0.9 %
10 SYRINGE (ML) INJECTION AS NEEDED
Status: DISCONTINUED | OUTPATIENT
Start: 2023-11-04 | End: 2023-11-04 | Stop reason: HOSPADM

## 2023-11-04 RX ORDER — LISINOPRIL 10 MG/1
20 TABLET ORAL ONCE
Status: COMPLETED | OUTPATIENT
Start: 2023-11-04 | End: 2023-11-04

## 2023-11-04 RX ORDER — AMLODIPINE BESYLATE 10 MG/1
10 TABLET ORAL ONCE
Status: COMPLETED | OUTPATIENT
Start: 2023-11-04 | End: 2023-11-04

## 2023-11-04 RX ADMIN — LISINOPRIL 20 MG: 10 TABLET ORAL at 04:02

## 2023-11-04 RX ADMIN — AMLODIPINE BESYLATE 10 MG: 10 TABLET ORAL at 04:02

## 2023-11-04 NOTE — ED PROVIDER NOTES
"Subjective   History of Present Illness  This is a 58-year-old male that presents the ER with multiple complaints.  Patient says approximately 1 week ago he started having some right hip pain and also describes some lesions to his right scrotum after shaving.  He had genital warts in college and was worried that he may have recurrent genital warts or STD.  He has not had sexual intercourse in quite some time and his last sexual partner told him to \"get checked out\".  Patient was not certain if his partner at that time had an STD.  Patient denies any dysuria, urgency, or frequency.  He denies any penile discharge.  He denies abdominal pain or flank or CVA pain.  He denies fever or chills.  Patient says that he was playing golf all day today and this afternoon he reported some swelling to his ankles and feet.  He also described malaise/fatigue and had a generalized headache.  He describes numbness and tingling to his entire body.  He denied any visual changes or dizziness or near syncope/syncope.  He denied any chest pain or shortness of breath.  Patient says that he was recently diagnosed with sinus infection per PCP and prescribed Keflex 500 mg by mouth 3 times daily x10 days.  This was prescribed on 10/30/2023.  Patient does have past medical history of hypertension and says that it is not well controlled.  He denies any changes to his antihypertensive regimen and takes Lotrel 10 mg / 40 mg by mouth daily as well as HCTZ 25 mg by mouth daily.  Patient also has history of gout and takes daily aspirin 81 mg by mouth.  No other concerns at this time.    History provided by:  Patient  Illness  Location:  Pedal edema, tingling all over body, elevated BP, right hip pain, genital lesions on right scrotum.  Duration:  1 week  Timing:  Constant  Chronicity:  New  Context:  Pt played golf today.  Right hip pain x 1 week.  Pt noticed sensitive genital lesions to right scrotum.  Pt on abx for recent sinus infection.  Increased " "swelling to ankles today. No CP/SOA.  Pt reports generalized HA.  Associated symptoms: congestion, fatigue and headaches (mild generalized HA)    Associated symptoms: no abdominal pain, no chest pain, no cough, no diarrhea, no ear pain, no fever, no loss of consciousness, no myalgias, no nausea, no shortness of breath, no sore throat and no vomiting        Review of Systems   Constitutional:  Positive for activity change and fatigue. Negative for fever.   HENT:  Positive for congestion and sinus pressure. Negative for ear pain, sinus pain, sneezing and sore throat.         Recent sinus infection. Rx for Keflex 500 mg TID x 10 days on 10/30/23.   Respiratory: Negative.  Negative for cough and shortness of breath.         History of JULIA with use of CPAP.   Cardiovascular:  Positive for leg swelling (pedal edema to ankles/feet today.  Pt played golf all day.). Negative for chest pain and palpitations.   Gastrointestinal: Negative.  Negative for abdominal pain, diarrhea, nausea and vomiting.   Genitourinary:  Positive for genital sores (sensitive, sore lesions to right scrotum.). Negative for dysuria, flank pain, frequency and urgency.        Pt has not had a sexual partner in quite some time, but the last one told him that he needed to get \"checked out\"     Musculoskeletal: Negative.  Negative for back pain and myalgias.   Neurological:  Positive for headaches (mild generalized HA). Negative for dizziness, tremors, seizures, loss of consciousness, syncope, facial asymmetry, speech difficulty, weakness and numbness.        Tingling sensation all over entire body.     All other systems reviewed and are negative.      Past Medical History:   Diagnosis Date    Hypertension        No Known Allergies    Past Surgical History:   Procedure Laterality Date    ACHILLES TENDON SURGERY         Family History   Problem Relation Age of Onset    Diabetes Mother     Hypertension Mother     Hypertension Sister        Social History "     Socioeconomic History    Marital status: Single   Tobacco Use    Smoking status: Never    Smokeless tobacco: Never   Substance and Sexual Activity    Alcohol use: Yes     Alcohol/week: 2.0 standard drinks of alcohol     Types: 1 Cans of beer, 1 Shots of liquor per week     Comment: OCCASIONALLY    Drug use: No    Sexual activity: Yes     Partners: Female     Birth control/protection: Condom           Objective   Physical Exam  Vitals and nursing note reviewed.   Constitutional:       General: He is not in acute distress.     Appearance: Normal appearance. He is obese. He is not ill-appearing, toxic-appearing or diaphoretic.      Comments: No acute sign of pain or distress.  Patient does not appear ill.  Nontoxic.   HENT:      Head: Normocephalic and atraumatic.      Nose: Nose normal. No congestion or rhinorrhea.      Right Sinus: No maxillary sinus tenderness or frontal sinus tenderness.      Left Sinus: No maxillary sinus tenderness or frontal sinus tenderness.      Comments: No audible nasal congestion or rhinorrhea.  No frontal or maxillary sinus tenderness.     Mouth/Throat:      Mouth: Mucous membranes are moist.      Pharynx: Oropharynx is clear. No pharyngeal swelling, oropharyngeal exudate, posterior oropharyngeal erythema or uvula swelling.      Comments: Oral mucous membranes are moist.  Posterior pharynx is nonerythematous.  No exudate or vesicles.  I do not appreciate any lesions to the tongue or buccal mucosa  Eyes:      Extraocular Movements: Extraocular movements intact.      Conjunctiva/sclera: Conjunctivae normal.      Pupils: Pupils are equal, round, and reactive to light.   Cardiovascular:      Rate and Rhythm: Normal rate and regular rhythm. No extrasystoles are present.     Pulses: Normal pulses.           Dorsalis pedis pulses are 2+ on the right side and 2+ on the left side.        Posterior tibial pulses are 2+ on the right side and 2+ on the left side.      Heart sounds: Normal heart  sounds.      Comments: Regular rate and rhythm.  No ectopy.  Trace pitting edema to bilateral ankles and feet.  No erythema or warmth.  No swelling extending above the knees.  Pulmonary:      Effort: Pulmonary effort is normal. No tachypnea or accessory muscle usage.      Breath sounds: Normal breath sounds. No decreased breath sounds, wheezing, rhonchi or rales.      Comments: Regular respiratory effort.  Good air exchange to bilateral lung fields.  No wheezes, rhonchi, or rales.  Abdominal:      General: Bowel sounds are normal. There is no distension.      Palpations: Abdomen is soft.      Tenderness: There is no abdominal tenderness. There is no right CVA tenderness, left CVA tenderness, guarding or rebound.      Comments: Abdomen soft and nontender.  No flank or CVA tenderness.   Genitourinary:     Penis: No tenderness, discharge, swelling or lesions.       Testes:         Right: Tenderness or swelling not present.         Left: Tenderness or swelling not present.      Epididymis:      Right: Normal.      Left: Normal.      Comments: Patient had no skin lesions to the penis.  He had some raised papules to the right scrotum, but there were no vesicles.  These were skin colored.  Mild tenderness.  I did not appreciate any central pustule, induration, or swelling.  No other skin lesions or rash.  Musculoskeletal:         General: Normal range of motion.      Cervical back: Normal range of motion and neck supple.      Right lower leg: Edema present.      Left lower leg: Edema present.   Skin:     General: Skin is warm and dry.   Neurological:      General: No focal deficit present.      Mental Status: He is alert and oriented to person, place, and time.      Cranial Nerves: Cranial nerves 2-12 are intact.      Sensory: Sensation is intact.      Motor: Motor function is intact.      Coordination: Coordination is intact.      Gait: Gait is intact.      Comments: Neuro intact and nonfocal.         Procedures            ED Course  ED Course as of 11/04/23 0500   Sat Nov 04, 2023   0333 Discussed the case in detail with ER attending physician, Dr. Guaman.  I ordered EKG and some cardiac work-up as well as BNP.  I personally reviewed chest x-ray and patient had some cardiomegaly but no acute infiltrative process.  The radiologist read the film as mild pulmonary vascular congestion.  CT of the brain without contrast revealed no acute intracranial process.  Chemistries reveal BUN and creatinine 14 and 1.39.  LFTs were normal.  Last creatinine in epic approximately 6 months ago was 1.66.  Procalcitonin and lactic acid were normal.  TSH was normal.  BNP was 50 and high-sensitivity troponin was 11.  O2 sat is 96 to 100% on room air.  Blood pressure was quite elevated at 197/117.  Large blood pressure cuff was used.  We will order amlodipine 10 mg by mouth and lisinopril 20 mg by mouth and closely observe. [FC]   0453 I personally reviewed EKG which showed sinus rhythm.  No acute ST-T wave changes consistent with ischemia.  Repeat blood pressure after oral blood pressure meds were given was 167/99.  Patient is sleeping soundly.  Blood pressure was elevated with history of hypertension and with pedal edema and some cardiomegaly on chest x-ray, we will refer patient to our hypertension and heart failure clinic for further assessment.  Patient would benefit from echocardiogram.  Recommend compression stockings if patient will be playing golf or standing for several hours in a row.  Recommend low-sodium diet.  Continue with all other current medical management.  Return to the ER if worsening symptoms. [FC]   0455 Urinalysis revealed no microscopic blood, acute infectious process.  Gonorrhea and Chlamydia testing is in process and we will notify patient if any abnormalities.  Patient denies penile discharge.  Differential diagnoses of lesions to the right scrotum include folliculitis or genital warts.  Lesions do not appear consistent  with genital herpes. [FC]      ED Course User Index  [FC] Willow Myers, STEFANIE           Recent Results (from the past 24 hour(s))   Comprehensive Metabolic Panel    Collection Time: 11/04/23  2:49 AM    Specimen: Blood   Result Value Ref Range    Glucose 107 (H) 65 - 99 mg/dL    BUN 14 6 - 20 mg/dL    Creatinine 1.39 (H) 0.76 - 1.27 mg/dL    Sodium 140 136 - 145 mmol/L    Potassium 4.3 3.5 - 5.2 mmol/L    Chloride 100 98 - 107 mmol/L    CO2 30.0 (H) 22.0 - 29.0 mmol/L    Calcium 9.7 8.6 - 10.5 mg/dL    Total Protein 7.7 6.0 - 8.5 g/dL    Albumin 4.7 3.5 - 5.2 g/dL    ALT (SGPT) 24 1 - 41 U/L    AST (SGOT) 26 1 - 40 U/L    Alkaline Phosphatase 65 39 - 117 U/L    Total Bilirubin 0.6 0.0 - 1.2 mg/dL    Globulin 3.0 gm/dL    A/G Ratio 1.6 g/dL    BUN/Creatinine Ratio 10.1 7.0 - 25.0    Anion Gap 10.0 5.0 - 15.0 mmol/L    eGFR 59.5 (L) >60.0 mL/min/1.73   Single High Sensitivity Troponin T    Collection Time: 11/04/23  2:49 AM    Specimen: Blood   Result Value Ref Range    HS Troponin T 11 <15 ng/L   BNP    Collection Time: 11/04/23  2:49 AM    Specimen: Blood   Result Value Ref Range    proBNP 50.1 0.0 - 900.0 pg/mL   TSH    Collection Time: 11/04/23  2:49 AM    Specimen: Blood   Result Value Ref Range    TSH 1.630 0.270 - 4.200 uIU/mL   Lactic Acid, Plasma    Collection Time: 11/04/23  2:49 AM    Specimen: Blood   Result Value Ref Range    Lactate 1.3 0.5 - 2.0 mmol/L   Procalcitonin    Collection Time: 11/04/23  2:49 AM    Specimen: Blood   Result Value Ref Range    Procalcitonin 0.06 0.00 - 0.25 ng/mL   CBC Auto Differential    Collection Time: 11/04/23  2:49 AM    Specimen: Blood   Result Value Ref Range    WBC 8.36 3.40 - 10.80 10*3/mm3    RBC 5.21 4.14 - 5.80 10*6/mm3    Hemoglobin 13.2 13.0 - 17.7 g/dL    Hematocrit 41.7 37.5 - 51.0 %    MCV 80.0 79.0 - 97.0 fL    MCH 25.3 (L) 26.6 - 33.0 pg    MCHC 31.7 31.5 - 35.7 g/dL    RDW 14.6 12.3 - 15.4 %    RDW-SD 42.4 37.0 - 54.0 fl    MPV 10.9 6.0 - 12.0 fL     Platelets 260 140 - 450 10*3/mm3    Neutrophil % 65.3 42.7 - 76.0 %    Lymphocyte % 23.7 19.6 - 45.3 %    Monocyte % 6.9 5.0 - 12.0 %    Eosinophil % 3.3 0.3 - 6.2 %    Basophil % 0.6 0.0 - 1.5 %    Immature Grans % 0.2 0.0 - 0.5 %    Neutrophils, Absolute 5.45 1.70 - 7.00 10*3/mm3    Lymphocytes, Absolute 1.98 0.70 - 3.10 10*3/mm3    Monocytes, Absolute 0.58 0.10 - 0.90 10*3/mm3    Eosinophils, Absolute 0.28 0.00 - 0.40 10*3/mm3    Basophils, Absolute 0.05 0.00 - 0.20 10*3/mm3    Immature Grans, Absolute 0.02 0.00 - 0.05 10*3/mm3    nRBC 0.0 0.0 - 0.2 /100 WBC   Urinalysis With Microscopic If Indicated (No Culture) - Urine, Clean Catch    Collection Time: 11/04/23  3:51 AM    Specimen: Urine, Clean Catch   Result Value Ref Range    Color, UA Yellow Yellow, Straw    Appearance, UA Clear Clear    pH, UA 6.5 5.0 - 8.0    Specific Gravity, UA 1.008 1.001 - 1.030    Glucose, UA Negative Negative    Ketones, UA Negative Negative    Bilirubin, UA Negative Negative    Blood, UA Negative Negative    Protein, UA Negative Negative    Leuk Esterase, UA Negative Negative    Nitrite, UA Negative Negative    Urobilinogen, UA 0.2 E.U./dL 0.2 - 1.0 E.U./dL   ECG 12 Lead Other; pedal edema    Collection Time: 11/04/23  3:51 AM   Result Value Ref Range    QT Interval 406 ms    QTC Interval 415 ms     Note: In addition to lab results from this visit, the labs listed above may include labs taken at another facility or during a different encounter within the last 24 hours. Please correlate lab times with ED admission and discharge times for further clarification of the services performed during this visit.    XR Chest 1 View   Final Result   Impression:   Mild vascular congestion/pulmonary edema pattern with cardiomegaly.            Electronically Signed: Neela Wan MD     11/4/2023 3:08 AM EDT     Workstation ID: [id number]      CT Head Without Contrast   Final Result   Impression:   No acute intracranial process identified.             Electronically Signed: Neela Wan MD     11/4/2023 2:46 AM EDT     Workstation ID: [id number]        Vitals:    11/04/23 0352 11/04/23 0358 11/04/23 0428 11/04/23 0433   BP: (!) 170/108 (!) 167/103 167/99    BP Location: Left arm      Patient Position: Sitting      Pulse:  65 70 61   Resp:       Temp:       TempSrc:       SpO2:  99% 90% 97%   Weight:       Height:         Medications   sodium chloride 0.9 % flush 10 mL (has no administration in time range)   amLODIPine (NORVASC) tablet 10 mg (10 mg Oral Given 11/4/23 0402)   lisinopril (PRINIVIL,ZESTRIL) tablet 20 mg (20 mg Oral Given 11/4/23 0402)     ECG/EMG Results (last 24 hours)       ** No results found for the last 24 hours. **          ECG 12 Lead Other; pedal edema   Preliminary Result   Test Reason : Other~   Blood Pressure :   */*   mmHG   Vent. Rate :  63 BPM     Atrial Rate :  63 BPM      P-R Int : 192 ms          QRS Dur : 106 ms       QT Int : 406 ms       P-R-T Axes :  -6  33  -9 degrees      QTc Int : 415 ms      ** Poor data quality, interpretation may be adversely affected   Normal sinus rhythm   Possible Inferior infarct , age undetermined   Possible Anterior infarct , age undetermined   Abnormal ECG   When compared with ECG of 07-MAY-2019 04:13,   No significant change was found      Referred By: EDMD           Confirmed By:                                           Medical Decision Making  Amount and/or Complexity of Data Reviewed  Labs: ordered.  Radiology: ordered.  ECG/medicine tests: ordered.    Risk  Prescription drug management.        Final diagnoses:   Elevated blood pressure reading with diagnosis of hypertension   Numbness and tingling   Pedal edema   Chronic renal impairment, unspecified CKD stage   History of hypertension   History of obesity   Genital lesion, male   JULIA on CPAP       ED Disposition  ED Disposition       ED Disposition   Discharge    Condition   Stable    Comment   --               [facility]  CARDIOLOGY  [address]  [address]  [place] [id number] [phone]  Call in 2 days  [facility]    [facility] CARDIOLOGY  [address]  [address]  [place] [id number] [phone]  Call in 2 days  Call Monday [facility] close [facility]    [facility] EMERGENCY DEPARTMENT  [address] [id number] [phone]    If symptoms worsen         Medication List      No changes were made to your prescriptions during this visit.            Willow Myers PA-C  11/04/23 0506

## 2023-11-04 NOTE — DISCHARGE INSTRUCTIONS
ER evaluation reveals normal EKG and normal troponin.  Chest x-ray reveals mildly enlarged heart but no evidence of heart failure or acute infectious process.  Urinalysis was completely normal and there was no evidence of infection or protein in the urine.  Gonorrhea and Chlamydia testing are in process.  We will notify patient if abnormal results.  Genital lesions do not appear consistent with genital herpes.  Patient could have folliculitis from shaving or genital warts with history of warts.  Recommend close PCP follow-up for recheck.  Thyroid studies were normal and kidney function or creatinine was around the same as previously of 1.3.  Patient's blood pressure was quite elevated with history of hypertension.  We will give information on hypertension and a Dash eating plan.  Patient needs to wear compression stockings if he will be standing for extended periods such as playing golf.  Recommend low-sodium diet.  We will refer patient to the chest pain clinic in the heart failure clinic and recommend echocardiogram or ultrasound of the heart for further assessment.  Continue with all current medical management.  Return to the ER if worsening symptoms.

## 2023-11-07 LAB
C TRACH RRNA SPEC QL NAA+PROBE: NEGATIVE
N GONORRHOEA RRNA SPEC QL NAA+PROBE: NEGATIVE

## 2023-11-12 ENCOUNTER — TELEPHONE (OUTPATIENT)
Dept: CARDIOLOGY | Facility: HOSPITAL | Age: 58
End: 2023-11-12
Payer: COMMERCIAL

## 2023-11-12 NOTE — TELEPHONE ENCOUNTER
Patient was referred to [facility] by the [facility]. Several attempts have been made to contact the patient with no success. Letter was mailed to pt to contact the office to schedule.

## 2024-05-09 PROBLEM — I10 ESSENTIAL HYPERTENSION: Status: ACTIVE | Noted: 2024-05-09

## 2024-05-15 ENCOUNTER — TELEPHONE (OUTPATIENT)
Dept: CARDIOLOGY | Facility: CLINIC | Age: 59
End: 2024-05-15

## 2024-05-15 NOTE — TELEPHONE ENCOUNTER
Caller: William Kilpatrick    Relationship to patient: Self    Best call back number: [phone]    Type of visit: NEW PATIENT     Requested date: NEXT AVAILABLE     If rescheduling, when is the original appointment: 5-16-24     Additional notes:PLEASE CONTACT PATIENT WITH A SUITABLE DATE.

## (undated) DEVICE — THE ENDO CARRY-ON PROCEDURE KIT CONTAINS ALL OF THE SUPPLIES AND INFECTION PREVENTION PRODUCTS NEEDED FOR ENDOSCOPIC PROCEDURES: Brand: ENDO CARRY-ON PROCEDURE KIT

## (undated) DEVICE — FORCEPS BX L240CM JAW DIA2.4MM ORNG L CAP W/ NDL DISP RAD

## (undated) DEVICE — MASK ANES INF SZ 2 PREM TAIL VLV INFL PRT UNSCENTED SGL PT